# Patient Record
Sex: FEMALE | Race: WHITE | Employment: FULL TIME | URBAN - METROPOLITAN AREA
[De-identification: names, ages, dates, MRNs, and addresses within clinical notes are randomized per-mention and may not be internally consistent; named-entity substitution may affect disease eponyms.]

---

## 2019-05-31 ENCOUNTER — APPOINTMENT (OUTPATIENT)
Dept: GENERAL RADIOLOGY | Age: 54
End: 2019-05-31
Payer: COMMERCIAL

## 2019-05-31 ENCOUNTER — HOSPITAL ENCOUNTER (EMERGENCY)
Age: 54
Discharge: HOME | End: 2019-05-31
Attending: EMERGENCY MEDICINE
Payer: COMMERCIAL

## 2019-05-31 VITALS
OXYGEN SATURATION: 99 % | BODY MASS INDEX: 34.96 KG/M2 | RESPIRATION RATE: 18 BRPM | WEIGHT: 190 LBS | HEART RATE: 85 BPM | HEIGHT: 62 IN | SYSTOLIC BLOOD PRESSURE: 188 MMHG | DIASTOLIC BLOOD PRESSURE: 103 MMHG | TEMPERATURE: 98.4 F

## 2019-05-31 DIAGNOSIS — R05.9 COUGH: Primary | ICD-10-CM

## 2019-05-31 PROCEDURE — 71046 X-RAY EXAM CHEST 2 VIEWS: CPT

## 2019-05-31 PROCEDURE — 96372 THER/PROPH/DIAG INJ SC/IM: CPT

## 2019-05-31 PROCEDURE — 6370000000 HC RX 637 (ALT 250 FOR IP): Performed by: STUDENT IN AN ORGANIZED HEALTH CARE EDUCATION/TRAINING PROGRAM

## 2019-05-31 PROCEDURE — 6360000002 HC RX W HCPCS

## 2019-05-31 PROCEDURE — 99283 EMERGENCY DEPT VISIT LOW MDM: CPT

## 2019-05-31 RX ORDER — ATORVASTATIN CALCIUM 20 MG/1
20 TABLET, FILM COATED ORAL DAILY
COMMUNITY

## 2019-05-31 RX ORDER — HYDROCHLOROTHIAZIDE 25 MG/1
25 TABLET ORAL 4 TIMES DAILY
COMMUNITY

## 2019-05-31 RX ORDER — LOSARTAN POTASSIUM 25 MG/1
25 TABLET ORAL 4 TIMES DAILY
COMMUNITY

## 2019-05-31 RX ORDER — DIAZEPAM 5 MG/1
5 TABLET ORAL ONCE
Status: COMPLETED | OUTPATIENT
Start: 2019-05-31 | End: 2019-05-31

## 2019-05-31 RX ORDER — TOPIRAMATE 25 MG/1
50 TABLET ORAL 2 TIMES DAILY
COMMUNITY

## 2019-05-31 RX ORDER — LIDOCAINE 50 MG/G
1 PATCH TOPICAL DAILY
Qty: 30 PATCH | Refills: 0 | Status: SHIPPED | OUTPATIENT
Start: 2019-05-31 | End: 2019-06-30

## 2019-05-31 RX ORDER — CITALOPRAM 20 MG/1
20 TABLET ORAL 4 TIMES DAILY
COMMUNITY

## 2019-05-31 RX ORDER — KETOROLAC TROMETHAMINE 30 MG/ML
15 INJECTION, SOLUTION INTRAMUSCULAR; INTRAVENOUS ONCE
Status: DISCONTINUED | OUTPATIENT
Start: 2019-05-31 | End: 2019-05-31

## 2019-05-31 RX ORDER — DIAZEPAM 5 MG/1
5 TABLET ORAL EVERY 6 HOURS PRN
Qty: 10 TABLET | Refills: 0 | Status: SHIPPED | OUTPATIENT
Start: 2019-05-31 | End: 2019-06-10

## 2019-05-31 RX ORDER — KETOROLAC TROMETHAMINE 30 MG/ML
15 INJECTION, SOLUTION INTRAMUSCULAR; INTRAVENOUS ONCE
Status: COMPLETED | OUTPATIENT
Start: 2019-05-31 | End: 2019-05-31

## 2019-05-31 RX ORDER — KETOROLAC TROMETHAMINE 30 MG/ML
INJECTION, SOLUTION INTRAMUSCULAR; INTRAVENOUS
Status: COMPLETED
Start: 2019-05-31 | End: 2019-05-31

## 2019-05-31 RX ORDER — LIDOCAINE 4 G/G
1 PATCH TOPICAL DAILY
Status: DISCONTINUED | OUTPATIENT
Start: 2019-05-31 | End: 2019-05-31 | Stop reason: HOSPADM

## 2019-05-31 RX ORDER — AMLODIPINE BESYLATE 10 MG/1
10 TABLET ORAL DAILY
COMMUNITY

## 2019-05-31 RX ADMIN — DIAZEPAM 5 MG: 5 TABLET ORAL at 16:09

## 2019-05-31 RX ADMIN — KETOROLAC TROMETHAMINE 15 MG: 30 INJECTION, SOLUTION INTRAMUSCULAR; INTRAVENOUS at 15:27

## 2019-05-31 RX ADMIN — KETOROLAC TROMETHAMINE 15 MG: 30 INJECTION, SOLUTION INTRAMUSCULAR at 15:27

## 2019-05-31 ASSESSMENT — ENCOUNTER SYMPTOMS
NAUSEA: 0
ABDOMINAL PAIN: 0
COUGH: 1
STRIDOR: 0
VOMITING: 0
DIARRHEA: 0
CHEST TIGHTNESS: 0
BLOOD IN STOOL: 0
CONSTIPATION: 0
RHINORRHEA: 0
SORE THROAT: 0

## 2019-05-31 ASSESSMENT — PAIN DESCRIPTION - LOCATION: LOCATION: CHEST

## 2019-05-31 ASSESSMENT — PAIN SCALES - GENERAL: PAINLEVEL_OUTOF10: 10

## 2019-05-31 NOTE — ED NOTES
Pt here for rib pain after coughing. C/o 10/10 pain. Pt to XR at this time.      Katalina Renner RN  05/31/19 [id number]

## 2019-05-31 NOTE — ED PROVIDER NOTES
ED Attending Attestation Note     Date of evaluation: 5/31/2019    This patient was seen by the resident. I have seen and examined the patient, agree with the workup, evaluation, management and diagnosis. The care plan has been discussed. My assessment reveals a 44-year-old female presenting to the emergency department with right lower costal margin pain. On examination, the patient has tenderness to palpation along the right anterior and inferior chest wall. No abdominal tenderness. No abdominal rebound or guarding.      Vanessa Garcia MD  05/31/19 [id number]

## 2019-05-31 NOTE — ED PROVIDER NOTES
[id number] [facility] RESIDENT NOTE       Date of evaluation: 5/31/2019    Chief Complaint     Other (coughed really hard and heard something pop)      History of Present Illness     Lauren Kingston is a 49 y.o. female with PMH HTN, migraine, HLD, iron deficiency who presents with cough. tthe patient reports that she had a coughing episode on the evening prior to presentation and since that time has had right inferior costal margin pain. This pain came on fairly suddenly during a coughing spell, does not radiate, severe, unrelieved without coughing. She has had no dyspnea, hemoptysis, pleuritic pain. She's had no recent trauma. She had a cholecystectomy years ago. She has had no nausea, vomiting. No fevers. She was seen at an urgent care the day prior to presentation, diagnosed with bronchitis, given steroids and put on a taper. Review of Systems     Review of Systems   Constitutional: Negative for chills, fever and unexpected weight change. HENT: Negative for rhinorrhea and sore throat. Eyes: Negative for visual disturbance. Respiratory: Positive for cough. Negative for chest tightness and stridor. Cardiovascular: Negative for chest pain and leg swelling. Gastrointestinal: Negative for abdominal pain, blood in stool, constipation, diarrhea, nausea and vomiting. Genitourinary: Negative for dysuria. Skin: Negative for rash. Neurological: Negative for dizziness, weakness and headaches. Past Medical, Surgical, Family, and Social History     She has a past medical history of Degenerative arthritis of spine, Hyperlipidemia, Hypertension, and Migraine. She has a past surgical history that includes Wrist surgery and Cholecystectomy. Her family history is not on file. She reports that she has never smoked. She does not have any smokeless tobacco history on file. She reports that she does not drink alcohol or use drugs.     Medications Previous Medications    AMLODIPINE (NORVASC) 10 MG TABLET    Take 10 mg by mouth daily    ATORVASTATIN (LIPITOR) 20 MG TABLET    Take 20 mg by mouth daily    CITALOPRAM (CELEXA) 20 MG TABLET    Take 20 mg by mouth 4 times daily    HYDROCHLOROTHIAZIDE (HYDRODIURIL) 25 MG TABLET    Take 25 mg by mouth 4 times daily    LOSARTAN (COZAAR) 25 MG TABLET    Take 25 mg by mouth 4 times daily    TOPIRAMATE (TOPAMAX) 25 MG TABLET    Take 50 mg by mouth 2 times daily       Allergies     She has No Known Allergies. Physical Exam     INITIAL VITALS: BP: (!) 188/103, Temp: 98.4 °F (36.9 °C), Pulse: 85, Resp: 18, SpO2: 99 %   General:  Tearful 49year-old woman, nontoxic  HEENT:  Normocephalic, atraumatic. PERRL. Conjunctivae pink, moist. No scleral icterus. No nasal discharge. Moist mucus membranes. Neck:  Supple. Trachea midline. Pulmonary:   Normal respiratory effort. CTAB  Cardiac: Regular rate and rhythm. No murmurs, gallops or rubs. Radial pulses 2+ bilaterally. Capillary refill < 2 seconds in fingers. Abdomen:  Soft, not tender, not distended. Bowel sounds present. There is moderate tenderness with palpation on the mid clavicular region of the lower costal margin, this is not present when pressure is applied with the head of a stethoscope during auscultation. Musculoskeletal:  Normal bulk and tone for age. Skin:  Warm, dry. No rashes, ecchymosis or cyanosis. Neuro: Alert and oriented x 4. Cranial nerves grossly intact. Moving all extremities equally. Gait normal.  Extremities:  No peripheral edema. Psych: Euthymic affect. Normal rate and rhythm of speech with full prosody. Linear thought process. Diagnostic Results     EKG   Interpreted in conjunction with emergency department physician No att. providers found  None    RADIOLOGY:  XR CHEST STANDARD (2 VW)   Final Result      No consolidation          LABS:   No results found for this visit on 05/31/19.     ED BEDSIDE ULTRASOUND:  None    RECENT VITALS:  BP: Fern Carranza MD     On reevaluation the patient felt significant improved. She was discharged with diazepam as well as lidocaine patches. She is instructed to continue the prednisone taper. She was given strict return precautions including a breathing, worsening pain, or other worrisome symptoms. She was instructed not to drive while using the diazepam or drink alcohol. At this time the patient has been deemed safe for discharge. My customary discharge instructions including strict return precautions for worsening or new symptoms have been communicated. The patient was able to have all questions answered and was in agreement with the stated plan. This patient was also evaluated by the attending physician. All care plans were discussed and agreed upon. Clinical Impression     1. Cough        Disposition     PATIENT REFERRED TO:  Adán Patterson    Schedule an appointment as soon as possible for a visit in 1 week  As needed, If symptoms worsen    [facility] Emergency Department  [address] [place] [address]  Go to   As needed, If symptoms worsen      DISCHARGE MEDICATIONS:  New Prescriptions    DIAZEPAM (VALIUM) 5 MG TABLET    Take 1 tablet by mouth every 6 hours as needed (muscle spasm) for up to 10 days. LIDOCAINE (LIDODERM) 5 %    Place 1 patch onto the skin daily 12 hours on, 12 hours off.        DISPOSITION Decision To Discharge 05/31/2019 04:00:51 PM         Jordin Mac MD  Resident  05/31/19 [id number]

## 2021-04-24 NOTE — ED NOTES
Patient prepared for and ready to be discharged. Dressed in clothes and given belongings. Patient discharged at this time in no acute distress after she verbalized understanding of discharge instructions. Reviewed medications, and when to return to the ED with patient. Encouraged follow up with PCP  Patient walked to lobby, Family to take patient home.        Kendrick King RN  05/31/19 [id number] - - -